# Patient Record
Sex: MALE | Race: WHITE | NOT HISPANIC OR LATINO | Employment: FULL TIME | URBAN - METROPOLITAN AREA
[De-identification: names, ages, dates, MRNs, and addresses within clinical notes are randomized per-mention and may not be internally consistent; named-entity substitution may affect disease eponyms.]

---

## 2017-05-30 ENCOUNTER — HOSPITAL ENCOUNTER (EMERGENCY)
Facility: HOSPITAL | Age: 32
Discharge: HOME | End: 2017-05-30
Attending: EMERGENCY MEDICINE | Admitting: EMERGENCY MEDICINE

## 2017-05-30 VITALS
RESPIRATION RATE: 18 BRPM | OXYGEN SATURATION: 98 % | HEART RATE: 82 BPM | HEIGHT: 65 IN | DIASTOLIC BLOOD PRESSURE: 61 MMHG | TEMPERATURE: 98.2 F | SYSTOLIC BLOOD PRESSURE: 120 MMHG | BODY MASS INDEX: 24.32 KG/M2 | WEIGHT: 146 LBS

## 2017-05-30 DIAGNOSIS — K08.89 PAIN, DENTAL: Primary | ICD-10-CM

## 2017-05-30 PROCEDURE — 99282 EMERGENCY DEPT VISIT SF MDM: CPT

## 2017-05-30 RX ORDER — PENICILLIN V POTASSIUM 500 MG/1
500 TABLET ORAL 3 TIMES DAILY
Qty: 30 TABLET | Refills: 0 | Status: SHIPPED | OUTPATIENT
Start: 2017-05-30 | End: 2017-06-09

## 2017-05-30 RX ORDER — OXYCODONE HYDROCHLORIDE AND ACETAMINOPHEN 5; 325 MG/1; MG/1
1 TABLET ORAL EVERY 6 HOURS PRN
Qty: 12 TABLET | Refills: 0 | Status: SHIPPED | OUTPATIENT
Start: 2017-05-30 | End: 2017-06-02

## 2017-08-24 ENCOUNTER — HOSPITAL ENCOUNTER (EMERGENCY)
Facility: HOSPITAL | Age: 32
Discharge: HOME | End: 2017-08-24
Attending: EMERGENCY MEDICINE | Admitting: EMERGENCY MEDICINE

## 2017-08-24 VITALS
DIASTOLIC BLOOD PRESSURE: 60 MMHG | BODY MASS INDEX: 23.3 KG/M2 | RESPIRATION RATE: 18 BRPM | HEART RATE: 76 BPM | WEIGHT: 140 LBS | OXYGEN SATURATION: 95 % | SYSTOLIC BLOOD PRESSURE: 133 MMHG | TEMPERATURE: 97.6 F

## 2017-08-24 DIAGNOSIS — T15.01XA FOREIGN BODY OF RIGHT CORNEA, INITIAL ENCOUNTER: Primary | ICD-10-CM

## 2017-08-24 PROCEDURE — 99283 EMERGENCY DEPT VISIT LOW MDM: CPT

## 2017-08-24 RX ADMIN — FLUORESCEIN SODIUM 1 STRIP: 1 STRIP OPHTHALMIC at 14:48

## 2018-02-28 ENCOUNTER — HOSPITAL ENCOUNTER (EMERGENCY)
Facility: HOSPITAL | Age: 33
Discharge: HOME | End: 2018-02-28
Attending: EMERGENCY MEDICINE

## 2018-02-28 VITALS
RESPIRATION RATE: 18 BRPM | OXYGEN SATURATION: 98 % | SYSTOLIC BLOOD PRESSURE: 111 MMHG | DIASTOLIC BLOOD PRESSURE: 64 MMHG | HEART RATE: 75 BPM | HEIGHT: 65 IN | TEMPERATURE: 97.7 F | WEIGHT: 140 LBS | BODY MASS INDEX: 23.32 KG/M2

## 2018-02-28 DIAGNOSIS — J01.90 ACUTE RHINOSINUSITIS: Primary | ICD-10-CM

## 2018-02-28 PROCEDURE — 99283 EMERGENCY DEPT VISIT LOW MDM: CPT

## 2018-02-28 RX ORDER — FLUTICASONE PROPIONATE 50 MCG
1 SPRAY, SUSPENSION (ML) NASAL DAILY
Qty: 16 G | Refills: 0 | Status: SHIPPED | OUTPATIENT
Start: 2018-02-28

## 2018-02-28 RX ORDER — AZITHROMYCIN 250 MG/1
TABLET, FILM COATED ORAL
Qty: 6 TABLET | Refills: 0 | Status: SHIPPED | OUTPATIENT
Start: 2018-02-28 | End: 2018-03-03

## 2018-02-28 NOTE — DISCHARGE INSTRUCTIONS
Rhinosinusitis   WHAT YOU NEED TO KNOW:   Rhinosinusitis (RS) is inflammation of your nose and sinuses  It commonly begins as a virus, often as a common cold  Viruses usually last 7 to 10 days and do not need treatment  When the virus does not get better on its own, you may have bacterial RS  This means that bacteria have begun to grow inside your sinuses  Acute RS lasts less than 4 weeks  Chronic RS lasts 12 weeks or more  Recurrent RS is when you have 4 or more episodes of RS in one year  DISCHARGE INSTRUCTIONS:   Return to the emergency department if:   · Your eye and eyelid are red, swollen, and painful  · You cannot open your eye  · You have double vision or you cannot see  · Your eyeball bulges out or you cannot move your eye  · You are more sleepy than normal or you notice changes in your ability to think, move, or talk  · You have a stiff neck, a fever, or a bad headache  · You have swelling of your forehead or scalp  Contact your healthcare provider if:   · Your symptoms are worse or do not improve after 3 to 5 days of treatment  · You have questions or concerns about your condition or care  Medicines: You may need any of the following:  · Acetaminophen  decreases pain and fever  It is available without a doctor's order  Ask how much to take and how often to take it  Follow directions  Acetaminophen can cause liver damage if not taken correctly  · NSAIDs , such as ibuprofen, help decrease swelling, pain, and fever  This medicine is available with or without a doctor's order  NSAIDs can cause stomach bleeding or kidney problems in certain people  If you take blood thinner medicine, always ask your healthcare provider if NSAIDs are safe for you  Always read the medicine label and follow directions  · Nasal steroid sprays  decrease inflammation in your nose and sinuses  · Decongestants  reduce swelling and drain mucus in the nose and sinuses   They may help you breathe easier  · Antihistamines  dry mucus in the nose and relieve sneezing  · Antibiotics  treat a bacterial infection and may be needed if your symptoms do not improve or they get worse  · Take your medicine as directed  Contact your healthcare provider if you think your medicine is not helping or if you have side effects  Tell him or her if you are allergic to any medicine  Keep a list of the medicines, vitamins, and herbs you take  Include the amounts, and when and why you take them  Bring the list or the pill bottles to follow-up visits  Carry your medicine list with you in case of an emergency  Self-care:   · Rinse your sinuses  Use a sinus rinse device to rinse your nasal passages with a saline (salt water) solution  This will help thin the mucus in your nose and rinse away pollen and dirt  It will also help reduce swelling so you can breathe normally  Ask your healthcare provider how often to do this  · Breathe in steam   Heat a bowl of water until you see steam  Lean over the bowl and make a tent over your head with a large towel  Breathe deeply for about 20 minutes  Be careful not to get too close to the steam or burn yourself  Do this 3 times a day  You can also breathe deeply when you take a hot shower  · Sleep with your head elevated  Place an extra pillow under your head before you go to sleep to help your sinuses drain  · Drink liquids as directed  Ask your healthcare provider how much liquid to drink each day and which liquids are best for you  Liquids will thin the mucus in your nose and help it drain  Avoid drinks that contain alcohol or caffeine  · Do not smoke, and avoid secondhand smoke  Nicotine and other chemicals in cigarettes and cigars can make your symptoms worse  Ask your healthcare provider for information if you currently smoke and need help to quit  E-cigarettes or smokeless tobacco still contain nicotine   Talk to your healthcare provider before you use these products  Follow up with your healthcare provider as directed: Follow up if your symptoms are worse or not better after 3 to 5 days of treatment  Write down your questions so you remember to ask them during your visits  © 2017 [address] [employer] Information is for End User's use only and may not be sold, redistributed or otherwise used for commercial purposes  All illustrations and images included in CareNotes® are the copyrighted property of [employer]  or Drake Mayo  The above information is an  only  It is not intended as medical advice for individual conditions or treatments  Talk to your doctor, nurse or pharmacist before following any medical regimen to see if it is safe and effective for you

## 2018-02-28 NOTE — ED PROVIDER NOTES
History  Chief Complaint   Patient presents with    Sinus Problem     woke up this morning with sinus congestion     28year old male with past medical history significant for depression, bipolar disorder and chronic pancreatitis presents to ed with chief complaint of sinus congestion and pressure  Onset of symptoms reported as this morning  Location is reported as the upper respiratory tract  Quality is reported as congestion with pressure  Severity is reported as moderate  Associated symptoms: denies headache, denies fevers, positive for runny nose, positive for cough  Denies rash, denies facial swelling, denies sore throat  Modifiers: nothing has been tried for treatment of symptoms  Context: denies recent sick contacts or travel outside the country  Positive current smoker  History provided by:  Patient   used: No    Sinus Problem   Associated symptoms: congestion, cough and rhinorrhea    Associated symptoms: no chest pain, no chills, no ear pain, no fatigue, no fever, no headaches, no nausea, no shortness of breath, no sneezing, no sore throat, no vomiting and no wheezing        None       Past Medical History:   Diagnosis Date    Pancreatitis     Psychiatric disorder     depression, bipolar        Past Surgical History:   Procedure Laterality Date    LYMPH NODE DISSECTION         History reviewed  No pertinent family history  I have reviewed and agree with the history as documented  Social History   Substance Use Topics    Smoking status: Current Every Day Smoker     Packs/day: 1 00    Smokeless tobacco: Not on file    Alcohol use No        Review of Systems   Constitutional: Negative for activity change, appetite change, chills, diaphoresis, fatigue, fever and unexpected weight change  HENT: Positive for congestion, postnasal drip, rhinorrhea and sinus pressure   Negative for dental problem, drooling, ear discharge, ear pain, facial swelling, hearing loss, mouth sores, nosebleeds, sneezing, sore throat, tinnitus, trouble swallowing and voice change  Eyes: Negative for photophobia, pain, discharge, redness, itching and visual disturbance  Respiratory: Positive for cough  Negative for chest tightness, shortness of breath, wheezing and stridor  Cardiovascular: Negative for chest pain, palpitations and leg swelling  Gastrointestinal: Negative for abdominal pain, constipation, diarrhea, nausea and vomiting  Endocrine: Negative for cold intolerance, heat intolerance, polydipsia, polyphagia and polyuria  Genitourinary: Negative for decreased urine volume, difficulty urinating, dysuria, flank pain, hematuria and urgency  Musculoskeletal: Negative for arthralgias, back pain, gait problem, joint swelling, myalgias, neck pain and neck stiffness  Skin: Negative for color change, pallor, rash and wound  Allergic/Immunologic: Negative for environmental allergies, food allergies and immunocompromised state  Neurological: Negative for dizziness, tremors, seizures, syncope, facial asymmetry, speech difficulty, weakness, light-headedness, numbness and headaches  Hematological: Negative for adenopathy  Does not bruise/bleed easily  Psychiatric/Behavioral: Negative for agitation, behavioral problems and confusion  The patient is not nervous/anxious  All other systems reviewed and are negative  Physical Exam  ED Triage Vitals [02/28/18 0949]   Temperature Pulse Respirations Blood Pressure SpO2   97 7 °F (36 5 °C) 75 18 111/64 98 %      Temp src Heart Rate Source Patient Position - Orthostatic VS BP Location FiO2 (%)   -- -- -- -- --      Pain Score       No Pain           Orthostatic Vital Signs  Vitals:    02/28/18 0949   BP: 111/64   Pulse: 75       Physical Exam   Constitutional: He is oriented to person, place, and time  He appears well-developed and well-nourished  No distress     /64   Pulse 75   Temp 97 7 °F (36 5 °C)   Resp 18   Ht 5' 5" (1 651 m)   Wt 63 5 kg (140 lb)   SpO2 98%   BMI 23 30 kg/m²   interp normal, no intervention    HENT:   Head: Normocephalic and atraumatic  Right Ear: External ear normal    Left Ear: External ear normal    Mouth/Throat: No oropharyngeal exudate  There is yellow mucous drainage noted to the posterior pharynx  Uvula is midline without deviation  Tonsils without edema or purulent exudate  Nasal turbinates areinjected and edematous bilaterally with yellow mucous nasal drainage noted  There is tenderness to percussion to bilateral maxillary sinuses  No overlying erythema or edema  Eyes: Conjunctivae and EOM are normal  Pupils are equal, round, and reactive to light  Right eye exhibits no discharge  Left eye exhibits no discharge  No scleral icterus  Neck: Normal range of motion  Neck supple  No JVD present  No tracheal deviation present  Cardiovascular: Normal rate and regular rhythm  Pulmonary/Chest: Effort normal  No stridor  No respiratory distress  He has no wheezes  He has no rales  He exhibits no tenderness  Breath sounds present bilaterally on auscultation  Scattered rhonchi bilaterally  No retractions or accessory muscle use  Abdominal: Soft  Bowel sounds are normal  He exhibits no distension and no mass  There is no tenderness  There is no rebound and no guarding  No hernia  Musculoskeletal: Normal range of motion  He exhibits no edema, tenderness or deformity  Lymphadenopathy:     He has no cervical adenopathy  Neurological: He is alert and oriented to person, place, and time  He has normal reflexes  He displays normal reflexes  No cranial nerve deficit or sensory deficit  He exhibits normal muscle tone  Coordination normal    Skin: Skin is warm and dry  Capillary refill takes less than 2 seconds  No rash noted  He is not diaphoretic  No erythema  No pallor  Psychiatric: He has a normal mood and affect   His behavior is normal  Judgment and thought content normal    Nursing note and vitals reviewed  ED Medications  Medications - No data to display    Diagnostic Studies  Results Reviewed     None                 No orders to display              Procedures  Procedures       Phone Contacts  ED Phone Contact    ED Course  ED Course                                MDM  Number of Diagnoses or Management Options  Acute rhinosinusitis: new and does not require workup  Diagnosis management comments: ddx includes but is not limited to:  URI, RSV, sinusitis, OE, OM, serous otitis media,  flu, allergies, viral syndrome, asthma, bronchitis, pneumonia, consider but doubt interstitial lung disease, pertussis  Discussed with patient symptoms most consistent with rhinosinusitis  Discussed outpatient treatment plan and follow up with pcp for recheck in 2-3 days  Reviewed reasons to return to ed  Patient verbalized understanding of diagnosis and agreement with discharge plan of care as well as understanding of reasons to return to ed      smoking cessation:  patient is a current cigarette smoker, patient was counselled regarding smoking cessation here in ed   counselling was performed for greater than 3 minutes during which time patient was awake and is receptive to counselling  patient was offered therapy  for treatment but declined  additional resources regarding smoking cessation were given on discharge  Patient Progress  Patient progress: stable    CritCare Time    Disposition  Final diagnoses:   Acute rhinosinusitis     Time reflects when diagnosis was documented in both MDM as applicable and the Disposition within this note     Time User Action Codes Description Comment    2/28/2018 10:57 AM Sixto Xiao Add [J01 90] Acute rhinosinusitis       ED Disposition     ED Disposition Condition Comment    Discharge  Berta Real discharge to home/self care      Condition at discharge: Stable        Follow-up Information     Follow up With Specialties Details Why Contact Info Additional Information    Behzad Irving MD Family Medicine Call in 2 days for further evaluation of symptoms [address] [address]  [address] [facility] Emergency Department Emergency Medicine Go to If symptoms worsen [address] [facility] [address]  [address] [facility] ED, [address] [place], [place], [facility]        Discharge Medication List as of 2/28/2018 10:58 AM      START taking these medications    Details   azithromycin (ZITHROMAX Z-PORFIRIO) 250 mg tablet 2 tablets PO on day 1, then 1 tablet PO daily x 4 days  , Print      fluticasone (FLONASE) 50 mcg/act nasal spray 1 spray into each nostril daily, Starting Wed 2/28/2018, Print           No discharge procedures on file      ED Provider  Electronically Signed by           Jannet Collins PA-C  03/06/18 0000

## 2018-08-22 ENCOUNTER — HOSPITAL ENCOUNTER (EMERGENCY)
Facility: HOSPITAL | Age: 33
Discharge: HOME | End: 2018-08-22
Attending: EMERGENCY MEDICINE

## 2018-08-22 VITALS
SYSTOLIC BLOOD PRESSURE: 114 MMHG | TEMPERATURE: 97.6 F | BODY MASS INDEX: 23.32 KG/M2 | DIASTOLIC BLOOD PRESSURE: 72 MMHG | RESPIRATION RATE: 16 BRPM | HEART RATE: 82 BPM | OXYGEN SATURATION: 98 % | HEIGHT: 65 IN | WEIGHT: 139.99 LBS

## 2018-08-22 DIAGNOSIS — J02.0 STREP PHARYNGITIS: Primary | ICD-10-CM

## 2018-08-22 LAB — S PYO AG THROAT QL: POSITIVE

## 2018-08-22 PROCEDURE — 99283 EMERGENCY DEPT VISIT LOW MDM: CPT

## 2018-08-22 PROCEDURE — 87430 STREP A AG IA: CPT | Performed by: EMERGENCY MEDICINE

## 2018-08-22 RX ORDER — AMOXICILLIN AND CLAVULANATE POTASSIUM 875; 125 MG/1; MG/1
1 TABLET, FILM COATED ORAL ONCE
Status: COMPLETED | OUTPATIENT
Start: 2018-08-22 | End: 2018-08-22

## 2018-08-22 RX ORDER — PREDNISONE 20 MG/1
60 TABLET ORAL ONCE
Status: COMPLETED | OUTPATIENT
Start: 2018-08-22 | End: 2018-08-22

## 2018-08-22 RX ORDER — AMOXICILLIN AND CLAVULANATE POTASSIUM 875; 125 MG/1; MG/1
1 TABLET, FILM COATED ORAL EVERY 12 HOURS SCHEDULED
Qty: 14 TABLET | Refills: 0 | Status: SHIPPED | OUTPATIENT
Start: 2018-08-22 | End: 2018-08-29

## 2018-08-22 RX ORDER — PREDNISONE 20 MG/1
60 TABLET ORAL DAILY
Qty: 15 TABLET | Refills: 0 | Status: SHIPPED | OUTPATIENT
Start: 2018-08-22

## 2018-08-22 RX ADMIN — PREDNISONE 60 MG: 20 TABLET ORAL at 10:18

## 2018-08-22 RX ADMIN — AMOXICILLIN AND CLAVULANATE POTASSIUM 1 TABLET: 875; 125 TABLET, FILM COATED ORAL at 10:18

## 2018-08-22 NOTE — DISCHARGE INSTRUCTIONS

## 2018-08-22 NOTE — ED PROVIDER NOTES
History  Chief Complaint   Patient presents with    Sore Throat     Patient stated that he has been having a sore throat for the last two days  80-year-old male presents emergency department uncomplicated sore throat  He states this started two days ago  He is here with his son who is having similar symptoms for four days  The patient does have pharyngeal erythema without swelling or tonsillar exudate  Patient will have a rapid strep done a work note will be given at his request         History provided by:  Patient   used: No    Sore Throat   Location:  Generalized  Quality:  Aching  Severity:  Mild  Onset quality:  Gradual  Duration:  2 days  Timing:  Constant  Progression:  Worsening  Chronicity:  New  Worsened by:  Nothing  Ineffective treatments:  None tried  Associated symptoms: no adenopathy, no drooling, no headaches, no plugged ear sensation, no rash and no sinus congestion        Prior to Admission Medications   Prescriptions Last Dose Informant Patient Reported? Taking?   fluticasone (FLONASE) 50 mcg/act nasal spray   No No   Si spray into each nostril daily      Facility-Administered Medications: None       Past Medical History:   Diagnosis Date    Pancreatitis     Psychiatric disorder     depression, bipolar        Past Surgical History:   Procedure Laterality Date    LYMPH NODE DISSECTION         History reviewed  No pertinent family history  I have reviewed and agree with the history as documented  Social History   Substance Use Topics    Smoking status: Current Every Day Smoker     Packs/day: 1 00    Smokeless tobacco: Never Used    Alcohol use No        Review of Systems   HENT: Positive for sore throat  Negative for drooling  Skin: Negative for rash  Neurological: Negative for headaches  Hematological: Negative for adenopathy  All other systems reviewed and are negative        Physical Exam  Physical Exam   Constitutional: He is oriented to person, place, and time  He appears well-developed and well-nourished  No distress  HENT:   Head: Normocephalic and atraumatic  Right Ear: External ear normal    Left Ear: External ear normal    Mouth/Throat:       Eyes: Conjunctivae and EOM are normal  Right eye exhibits no discharge  Left eye exhibits no discharge  No scleral icterus  Neck: Normal range of motion  Neck supple  No JVD present  No tracheal deviation present  No thyromegaly present  Cardiovascular: Normal rate and regular rhythm  Pulmonary/Chest: Effort normal and breath sounds normal  No stridor  No respiratory distress  He has no wheezes  He has no rales  Abdominal: Soft  Bowel sounds are normal  He exhibits no distension  There is no tenderness  Musculoskeletal: Normal range of motion  He exhibits no edema, tenderness or deformity  Neurological: He is alert and oriented to person, place, and time  No cranial nerve deficit  Coordination normal    Skin: Skin is warm and dry  He is not diaphoretic  Psychiatric: He has a normal mood and affect  His behavior is normal    Nursing note and vitals reviewed        Vital Signs  ED Triage Vitals [08/22/18 0924]   Temperature Pulse Respirations Blood Pressure SpO2   97 6 °F (36 4 °C) 82 16 114/72 98 %      Temp Source Heart Rate Source Patient Position - Orthostatic VS BP Location FiO2 (%)   Oral Monitor Sitting Right arm --      Pain Score       --           Vitals:    08/22/18 0924   BP: 114/72   Pulse: 82   Patient Position - Orthostatic VS: Sitting       Visual Acuity      ED Medications  Medications   amoxicillin-clavulanate (AUGMENTIN) 875-125 mg per tablet 1 tablet (1 tablet Oral Given 8/22/18 1018)   predniSONE tablet 60 mg (60 mg Oral Given 8/22/18 1018)       Diagnostic Studies  Results Reviewed     Procedure Component Value Units Date/Time    Rapid Strep A Screen Only, Adults [[id number]]  (Abnormal) Collected:  08/22/18 0935    Lab Status:  Final result Specimen:  Throat from Throat Updated:  08/22/18 0949     Rapid Strep A Screen Positive (A)                 No orders to display              Procedures  Procedures       Phone Contacts  ED Phone Contact    ED Course                               MDM  Number of Diagnoses or Management Options  Strep pharyngitis: new and requires workup     Amount and/or Complexity of Data Reviewed  Clinical lab tests: reviewed and ordered  Decide to obtain previous medical records or to obtain history from someone other than the patient: yes  Review and summarize past medical records: yes      CritCare Time    Disposition  Final diagnoses:   Strep pharyngitis     Time reflects when diagnosis was documented in both MDM as applicable and the Disposition within this note     Time User Action Codes Description Comment    8/22/2018 10:03 AM Ellie Stockton Add [J02 0] Strep pharyngitis       ED Disposition     ED Disposition Condition Comment    Discharge  Reynaldo Atlantic discharge to home/self care  Condition at discharge: Good        Follow-up Information     Follow up With Specialties Details Why Contact Info    Garrison Ramesh MD Family Medicine   [place] [address]  [address]  [phone]            Discharge Medication List as of 8/22/2018 10:04 AM      START taking these medications    Details   amoxicillin-clavulanate (AUGMENTIN) 875-125 mg per tablet Take 1 tablet by mouth every 12 (twelve) hours for 7 days, Starting Wed 8/22/2018, Until Wed 8/29/2018, Print      predniSONE 20 mg tablet Take 3 tablets (60 mg total) by mouth daily, Starting Wed 8/22/2018, Print         CONTINUE these medications which have NOT CHANGED    Details   fluticasone (FLONASE) 50 mcg/act nasal spray 1 spray into each nostril daily, Starting Wed 2/28/2018, Print           No discharge procedures on file      ED Provider  Electronically Signed by           Vane Collins DO  08/25/18 3054

## 2018-08-30 ENCOUNTER — HOSPITAL ENCOUNTER (EMERGENCY)
Facility: HOSPITAL | Age: 33
Discharge: HOME | End: 2018-08-30
Attending: EMERGENCY MEDICINE | Admitting: EMERGENCY MEDICINE

## 2018-08-30 VITALS
WEIGHT: 146 LBS | BODY MASS INDEX: 24.32 KG/M2 | HEART RATE: 83 BPM | RESPIRATION RATE: 16 BRPM | SYSTOLIC BLOOD PRESSURE: 145 MMHG | OXYGEN SATURATION: 95 % | DIASTOLIC BLOOD PRESSURE: 83 MMHG | TEMPERATURE: 99.4 F | HEIGHT: 65 IN

## 2018-08-30 DIAGNOSIS — H66.42: Primary | ICD-10-CM

## 2018-08-30 PROCEDURE — 99283 EMERGENCY DEPT VISIT LOW MDM: CPT

## 2018-08-30 RX ORDER — HYDROCODONE BITARTRATE AND ACETAMINOPHEN 5; 325 MG/1; MG/1
1 TABLET ORAL ONCE
Status: COMPLETED | OUTPATIENT
Start: 2018-08-30 | End: 2018-08-30

## 2018-08-30 RX ORDER — NAPROXEN 250 MG/1
500 TABLET ORAL ONCE
Status: COMPLETED | OUTPATIENT
Start: 2018-08-30 | End: 2018-08-30

## 2018-08-30 RX ORDER — CIPROFLOXACIN AND DEXAMETHASONE 3; 1 MG/ML; MG/ML
4 SUSPENSION/ DROPS AURICULAR (OTIC) ONCE
Status: COMPLETED | OUTPATIENT
Start: 2018-08-30 | End: 2018-08-30

## 2018-08-30 RX ORDER — HYDROCODONE BITARTRATE AND ACETAMINOPHEN 5; 325 MG/1; MG/1
1 TABLET ORAL EVERY 6 HOURS PRN
Qty: 15 TABLET | Refills: 0 | Status: SHIPPED | OUTPATIENT
Start: 2018-08-30 | End: 2018-09-02

## 2018-08-30 RX ORDER — NAPROXEN 500 MG/1
500 TABLET ORAL 2 TIMES DAILY WITH MEALS
Qty: 20 TABLET | Refills: 0 | Status: SHIPPED | OUTPATIENT
Start: 2018-08-30 | End: 2018-09-09

## 2018-08-30 RX ORDER — CEFUROXIME AXETIL 500 MG/1
500 TABLET ORAL EVERY 12 HOURS SCHEDULED
Qty: 20 TABLET | Refills: 0 | Status: SHIPPED | OUTPATIENT
Start: 2018-08-30 | End: 2018-09-09

## 2018-08-30 RX ORDER — CEFUROXIME AXETIL 250 MG/1
500 TABLET ORAL ONCE
Status: COMPLETED | OUTPATIENT
Start: 2018-08-30 | End: 2018-08-30

## 2018-08-30 RX ORDER — CIPROFLOXACIN AND DEXAMETHASONE 3; 1 MG/ML; MG/ML
4 SUSPENSION/ DROPS AURICULAR (OTIC) 2 TIMES DAILY
Qty: 7.5 ML | Refills: 0 | Status: SHIPPED | OUTPATIENT
Start: 2018-08-30 | End: 2018-09-03

## 2018-08-30 RX ORDER — AMOXICILLIN AND CLAVULANATE POTASSIUM 875; 125 MG/1; MG/1
1 TABLET, FILM COATED ORAL ONCE
Status: DISCONTINUED | OUTPATIENT
Start: 2018-08-30 | End: 2018-08-30

## 2018-08-30 RX ADMIN — CEFUROXIME AXETIL 500 MG: 250 TABLET ORAL at 20:44

## 2018-08-30 RX ADMIN — CIPROFLOXACIN AND DEXAMETHASONE 4 DROP: 3; 1 SUSPENSION/ DROPS AURICULAR (OTIC) at 20:29

## 2018-08-30 RX ADMIN — NAPROXEN 500 MG: 250 TABLET ORAL at 20:27

## 2018-08-30 RX ADMIN — HYDROCODONE BITARTRATE AND ACETAMINOPHEN 1 TABLET: 5; 325 TABLET ORAL at 20:27

## 2018-08-31 NOTE — DISCHARGE INSTRUCTIONS
Please return if he developed worsening or other concerning symptoms otherwise your to follow up with ENT for re-evaluation as discussed    Otitis Media   WHAT YOU NEED TO KNOW:   Otitis media is an ear infection  DISCHARGE INSTRUCTIONS:   Medicines:  · Ibuprofen or acetaminophen  helps decrease your pain and fever  They are available without a doctor's order  Ask your healthcare provider which medicine is right for you  Ask how much to take and how often to take it  These medicines can cause stomach bleeding if not taken correctly  Ibuprofen can cause kidney damage  Do not take ibuprofen if you have kidney disease, an ulcer, or allergies to aspirin  Acetaminophen can cause liver damage  Do not drink alcohol if you take acetaminophen  · Ear drops  help treat your ear pain  · Antibiotics  help treat a bacterial infection that caused your ear infection  · Take your medicine as directed  Contact your healthcare provider if you think your medicine is not helping or if you have side effects  Tell him or her if you are allergic to any medicine  Keep a list of the medicines, vitamins, and herbs you take  Include the amounts, and when and why you take them  Bring the list or the pill bottles to follow-up visits  Carry your medicine list with you in case of an emergency  Heat or ice:   · Heat  may be used to decrease your pain  Place a warm, moist washcloth on your ear  Apply for 15 to 20 minutes, 3 to 4 times a day    · Ice  helps decrease swelling and pain  Use an ice pack or put crushed ice in a plastic bag  Cover the ice pack with a towel and place it on your ear for 15 to 20 minutes, 3 to 4 times a day for 2 days  Prevent otitis media:   · Wash your hands often  Use soap and water  Wash your hands after you use the bathroom, change a child's diapers, or sneeze  Wash your hands before you prepare or eat food  · Stay away from people who are ill    Some germs are easily and quickly spread through contact  Return to work or school: You may return to work or school when your fever is gone  Follow up with your healthcare provider as directed:  Write down your questions so you remember to ask them during your visits  Contact your healthcare provider if:   · Your ear pain gets worse or does not go away, even after treatment  · The outside of your ear is red or swollen  · You have vomiting or diarrhea  · You have fluid coming from your ear  · You have questions or concerns about your condition or care  Return to the emergency department if:   · You have a seizure  · You have a fever and a stiff neck  © 2017 [facility] Information is for End User's use only and may not be sold, redistributed or otherwise used for commercial purposes  All illustrations and images included in CareNotes® are the copyrighted property of [employer]  or Drake Mayo  The above information is an  only  It is not intended as medical advice for individual conditions or treatments  Talk to your doctor, nurse or pharmacist before following any medical regimen to see if it is safe and effective for you

## 2018-08-31 NOTE — ED PROVIDER NOTES
History  Chief Complaint   Patient presents with    Earache     pt c/o left earache that goes down to his jaw  19-year-old male with history of psychiatric disorder presenting for evaluation of left ear pain  Patient is here with his wife he reports that he was seen evaluated approximately a week ago for pharyngitis he started on Augmentin, he is improved from that aspect states over last 2 days he has had increasing left ear pain describes as severe throbbing sensation it is radiates into his immediate jaw no exacerbating remitting factors, is not improved with conservative treatment he notes that he does have poor dentition but his teeth do not currently hurt him he otherwise denies fevers change in hearing difficulty with his balance ringing in his ear denies other auditory visual or balance complaint denies headache or neck pain chest pain cough shortness of breath nausea vomiting abdominal pain change in bowels or bladder joint pain swelling rashes or other associated symptoms  Complete review systems otherwise negative as noted            Prior to Admission Medications   Prescriptions Last Dose Informant Patient Reported? Taking?   amoxicillin-clavulanate (AUGMENTIN) 875-125 mg per tablet   No No   Sig: Take 1 tablet by mouth every 12 (twelve) hours for 7 days   fluticasone (FLONASE) 50 mcg/act nasal spray   No No   Si spray into each nostril daily   predniSONE 20 mg tablet   No No   Sig: Take 3 tablets (60 mg total) by mouth daily      Facility-Administered Medications: None       Past Medical History:   Diagnosis Date    Pancreatitis     Psychiatric disorder     depression, bipolar        Past Surgical History:   Procedure Laterality Date    LYMPH NODE DISSECTION         History reviewed  No pertinent family history  I have reviewed and agree with the history as documented      Social History   Substance Use Topics    Smoking status: Current Every Day Smoker     Packs/day: 1 00     Types: Cigarettes    Smokeless tobacco: Never Used    Alcohol use No        Review of Systems   Constitutional: Negative for chills and fever  HENT: Positive for dental problem and ear pain  Negative for congestion, drooling, ear discharge, hearing loss, rhinorrhea, sore throat, tinnitus, trouble swallowing and voice change  Eyes: Negative for photophobia, pain, redness, itching and visual disturbance  Respiratory: Negative for cough and shortness of breath  Cardiovascular: Negative for chest pain and palpitations  Gastrointestinal: Negative for abdominal pain, diarrhea, nausea and vomiting  Genitourinary: Negative for dysuria, frequency and urgency  Musculoskeletal: Negative for gait problem, neck pain and neck stiffness  Skin: Negative for color change and rash  Neurological: Negative for dizziness, facial asymmetry, weakness, light-headedness and headaches  Hematological: Negative for adenopathy  Does not bruise/bleed easily  Psychiatric/Behavioral: Negative for agitation and behavioral problems  All other systems reviewed and are negative  Physical Exam  Physical Exam   Constitutional: He is oriented to person, place, and time  He appears well-developed and well-nourished  No distress  Very well-appearing pleasant, here with wife   HENT:   Head: Normocephalic and atraumatic  Right Ear: External ear normal    Mouth/Throat: Oropharynx is clear and moist  No oropharyngeal exudate  Patient's head neck exam catheterization significant for left tympanic membranes is severely erythematous dull bulging there is scant amount of discharge adjacent to the tympanic membrane although no overt rupture the external auditory canal is clear without edema or debris or overt discharge, there is no mastoid tenderness erythema warmth head neck exam otherwise unremarkable   Eyes: EOM are normal  Pupils are equal, round, and reactive to light  Neck: Normal range of motion  Neck supple   No tracheal deviation present  Cardiovascular: Normal rate, regular rhythm and normal heart sounds  Exam reveals no gallop and no friction rub  No murmur heard  Pulmonary/Chest: Effort normal and breath sounds normal  He has no wheezes  He has no rales  Abdominal: Soft  Bowel sounds are normal  He exhibits no distension  There is no tenderness  There is no rebound and no guarding  Musculoskeletal: Normal range of motion  He exhibits no edema or tenderness  Neurological: He is alert and oriented to person, place, and time  No cranial nerve deficit  He exhibits normal muscle tone  Coordination normal    Normal gait finger to nose pronator drift dysdiadochokinesis is intact cranial nerves 2-12 were intact muscle strength is 5/5 globally   Skin: Skin is warm and dry  No rash noted  Psychiatric: He has a normal mood and affect  His behavior is normal    Nursing note and vitals reviewed        Vital Signs  ED Triage Vitals [08/30/18 1908]   Temperature Pulse Respirations Blood Pressure SpO2   99 4 °F (37 4 °C) 83 19 138/93 98 %      Temp Source Heart Rate Source Patient Position - Orthostatic VS BP Location FiO2 (%)   Oral Monitor Sitting Left arm --      Pain Score       Worst Possible Pain           Vitals:    08/30/18 1908 08/30/18 2043   BP: 138/93 145/83   Pulse: 83 83   Patient Position - Orthostatic VS: Sitting Lying       Visual Acuity      ED Medications  Medications   naproxen (NAPROSYN) tablet 500 mg (500 mg Oral Given 8/30/18 2027)   HYDROcodone-acetaminophen (NORCO) 5-325 mg per tablet 1 tablet (1 tablet Oral Given 8/30/18 2027)   ciprofloxacin-dexamethasone (CIPRODEX) 0 3-0 1 % otic suspension 4 drop (4 drops Left Ear Given 8/30/18 2029)   cefuroxime (CEFTIN) tablet 500 mg (500 mg Oral Given 8/30/18 2044)       Diagnostic Studies  Results Reviewed     None                 No orders to display              Procedures  Procedures       Phone Contacts  ED Phone Contact    ED Course MDM  Number of Diagnoses or Management Options  Suppurative otitis media of left ear:   Diagnosis management comments: 55-year-old male denies past medical history here for evaluation of left ear pain for last 2 days recently seen evaluated and treated for acute pharyngitis on Augmentin, pain is localized to his left ear radiates into his jaw he does have poor dentition but his teeth are not the source of his discomfort today, he has no facial weakness no difficulty with his balance no change in hearing or tinnitus he has no neck pain or stiffness or fevers on exam here he is afebrile normal vital signs patient is clinically very well appearing, he is neurologically intact he does have significant erythema bulging S scant amount of purulent discharge immediate to the tympanic membrane, there is no involvement of the external auditory canal there is no mastoid tenderness or erythema, consistent with suppurative otitis media possible minimal perforation, discussed this at length with patient and his wife, will change to Ceftin, pain controlled Ciprodex ENT follow-up very close return instructions should he developed difficulty with his balance facial weakness pain behind his ear or other concerning symptoms otherwise given ear precautions will follow up with ENT return instructions all agreement to plan    CritCare Time    Disposition  Final diagnoses:   Suppurative otitis media of left ear     Time reflects when diagnosis was documented in both MDM as applicable and the Disposition within this note     Time User Action Codes Description Comment    8/30/2018  8:31 PM Odalis Nicolas [[id number]] Suppurative otitis media of left ear       ED Disposition     ED Disposition Condition Comment    Discharge  Jaclyn Alert discharge to home/self care      Condition at discharge: Good        Follow-up Information     Follow up With Specialties Details Why Contact Info Additional Information    [facility] Emergency Department Emergency Medicine  If symptoms worsen [address] [facility] [address]  [phone] [place] [facility], [address], [place], [address] Nico Baker MD Otolaryngology In 3 days  [address]  [address]  [phone]             Discharge Medication List as of 8/30/2018  8:34 PM      START taking these medications    Details   cefuroxime (CEFTIN) 500 mg tablet Take 1 tablet (500 mg total) by mouth every 12 (twelve) hours for 10 days, Starting Th 8/30/2018, Until Sun 9/9/2018, Print      ciprofloxacin-dexamethasone (CIPRODEX) otic suspension Administer 4 drops into the left ear 2 (two) times a day for 7 doses, Starting Thu 8/30/2018, Until Mon 9/3/2018, Print      HYDROcodone-acetaminophen (NORCO) 5-325 mg per tablet Take 1 tablet by mouth every 6 (six) hours as needed for pain for up to 3 days Max Daily Amount: 4 tablets, Starting Thu 8/30/2018, Until Sun 9/2/2018, Print      naproxen (NAPROSYN) 500 mg tablet Take 1 tablet (500 mg total) by mouth 2 (two) times a day with meals for 10 days, Starting Thu 8/30/2018, Until Sun 9/9/2018, Print         CONTINUE these medications which have NOT CHANGED    Details   fluticasone (FLONASE) 50 mcg/act nasal spray 1 spray into each nostril daily, Starting Wed 2/28/2018, Print      predniSONE 20 mg tablet Take 3 tablets (60 mg total) by mouth daily, Starting Wed 8/22/2018, Print         STOP taking these medications       amoxicillin-clavulanate (AUGMENTIN) 875-125 mg per tablet Comments:   Reason for Stopping:             No discharge procedures on file      ED Provider  Electronically Signed by           Cherelle Melchor DO  08/31/18 4705

## 2018-08-31 NOTE — ED NOTES
Discharge instructions reviewed with patient  Patient verbalized understanding and denied any questions at this time  Pt  Ambulatory off unit with female visitor, who is patient's sober ride home       Isael Son RN  08/30/18 2046

## 2018-10-31 ENCOUNTER — APPOINTMENT (EMERGENCY)
Dept: RADIOLOGY | Facility: HOSPITAL | Age: 33
End: 2018-10-31

## 2018-10-31 ENCOUNTER — HOSPITAL ENCOUNTER (EMERGENCY)
Facility: HOSPITAL | Age: 33
Discharge: HOME | End: 2018-10-31
Attending: EMERGENCY MEDICINE | Admitting: EMERGENCY MEDICINE

## 2018-10-31 VITALS
TEMPERATURE: 97.5 F | DIASTOLIC BLOOD PRESSURE: 68 MMHG | OXYGEN SATURATION: 98 % | HEIGHT: 65 IN | WEIGHT: 141 LBS | RESPIRATION RATE: 16 BRPM | BODY MASS INDEX: 23.49 KG/M2 | HEART RATE: 65 BPM | SYSTOLIC BLOOD PRESSURE: 126 MMHG

## 2018-10-31 DIAGNOSIS — M79.652 ACUTE PAIN OF LEFT THIGH: Primary | ICD-10-CM

## 2018-10-31 DIAGNOSIS — S76.312A LEFT HAMSTRING STRAIN, INITIAL ENCOUNTER: ICD-10-CM

## 2018-10-31 PROCEDURE — 73552 X-RAY EXAM OF FEMUR 2/>: CPT

## 2018-10-31 PROCEDURE — 99283 EMERGENCY DEPT VISIT LOW MDM: CPT

## 2018-10-31 PROCEDURE — 96372 THER/PROPH/DIAG INJ SC/IM: CPT

## 2018-10-31 RX ORDER — CYCLOBENZAPRINE HCL 10 MG
10 TABLET ORAL
Qty: 10 TABLET | Refills: 0 | Status: SHIPPED | OUTPATIENT
Start: 2018-10-31 | End: 2018-11-10

## 2018-10-31 RX ORDER — KETOROLAC TROMETHAMINE 30 MG/ML
60 INJECTION, SOLUTION INTRAMUSCULAR; INTRAVENOUS ONCE
Status: COMPLETED | OUTPATIENT
Start: 2018-10-31 | End: 2018-10-31

## 2018-10-31 RX ORDER — IBUPROFEN 800 MG/1
800 TABLET ORAL 2 TIMES DAILY
Qty: 20 TABLET | Refills: 0 | Status: SHIPPED | OUTPATIENT
Start: 2018-10-31 | End: 2018-11-10

## 2018-10-31 RX ADMIN — KETOROLAC TROMETHAMINE 60 MG: 30 INJECTION, SOLUTION INTRAMUSCULAR at 11:37

## 2018-10-31 NOTE — ED PROVIDER NOTES
History  Chief Complaint   Patient presents with    Leg Pain     pt presents ambulatory with c/o L leg pain pt states "i was lifting a log at work and felt a pop" pt reports 10/10 pain for the last few weeks  HPI  80-year-old white male with a chief complaint of upper left leg pain  Patient states that he was lifting a lot at work a couple weeks ago and felt a pop  Since that time patient has been having pain in his posterior upper leg  Patient states that pain increases why he is standing on it  Patient is able to go up and down stairs  Prior to Admission Medications   Prescriptions Last Dose Informant Patient Reported? Taking?   ciprofloxacin-dexamethasone (CIPRODEX) otic suspension   No No   Sig: Administer 4 drops into the left ear 2 (two) times a day for 7 doses   fluticasone (FLONASE) 50 mcg/act nasal spray   No No   Si spray into each nostril daily   naproxen (NAPROSYN) 500 mg tablet   No No   Sig: Take 1 tablet (500 mg total) by mouth 2 (two) times a day with meals for 10 days   predniSONE 20 mg tablet   No No   Sig: Take 3 tablets (60 mg total) by mouth daily      Facility-Administered Medications: None       Past Medical History:   Diagnosis Date    Pancreatitis     Psychiatric disorder     depression, bipolar        Past Surgical History:   Procedure Laterality Date    LYMPH NODE DISSECTION         History reviewed  No pertinent family history  I have reviewed and agree with the history as documented  Social History   Substance Use Topics    Smoking status: Current Every Day Smoker     Packs/day: 1 00     Types: Cigarettes    Smokeless tobacco: Never Used    Alcohol use No        Review of Systems   Constitutional: Negative for chills and fever  HENT: Negative for congestion and rhinorrhea  Eyes: Negative for discharge and visual disturbance  Respiratory: Negative for shortness of breath and wheezing  Cardiovascular: Negative for chest pain and palpitations  Gastrointestinal: Negative for abdominal pain and vomiting  Endocrine: Negative for polydipsia and polyuria  Genitourinary: Negative for dysuria and hematuria  Musculoskeletal: Positive for arthralgias, gait problem and myalgias  Negative for neck stiffness  Skin: Negative for rash and wound  Neurological: Negative for dizziness and headaches  Psychiatric/Behavioral: Negative for confusion and suicidal ideas  Physical Exam  Physical Exam   Constitutional: He is oriented to person, place, and time  He appears well-developed and well-nourished  61-year-old white male lying on the stretcher with his knee slightly flexed complaining of left hamstring pain  HENT:   Head: Normocephalic and atraumatic  Mouth/Throat: Oropharynx is clear and moist    Eyes: Pupils are equal, round, and reactive to light  EOM are normal    Neck: Normal range of motion  Neck supple  Cardiovascular: Normal rate  Pulmonary/Chest: Effort normal    Abdominal: There is no tenderness  Musculoskeletal:   Left lower extremity:  There is tenderness to the posterior hamstring with some fullness noted at the distal aspect of the hamstring  Patient has difficulty in abduction and also some pain in flexion and extension  Neurological: He is alert and oriented to person, place, and time  No cranial nerve deficit  He exhibits normal muscle tone  Coordination normal    Skin: Skin is warm and dry  Psychiatric: He has a normal mood and affect  Nursing note and vitals reviewed        Vital Signs  ED Triage Vitals   Temperature Pulse Respirations Blood Pressure SpO2   10/31/18 1052 10/31/18 1051 10/31/18 1051 10/31/18 1051 10/31/18 1051   97 5 °F (36 4 °C) 83 20 114/63 98 %      Temp Source Heart Rate Source Patient Position - Orthostatic VS BP Location FiO2 (%)   10/31/18 1052 10/31/18 1051 10/31/18 1051 10/31/18 1051 --   Oral Monitor Sitting Right arm       Pain Score       10/31/18 1051       Worst Possible Pain Vitals:    10/31/18 1051 10/31/18 1203   BP: 114/63 126/68   Pulse: 83 65   Patient Position - Orthostatic VS: Sitting Lying       Visual Acuity      ED Medications  Medications   ketorolac (TORADOL) injection 60 mg (60 mg Intramuscular Given 10/31/18 1137)       Diagnostic Studies  Results Reviewed     None                 XR femur 2 views LEFT   ED Interpretation by Sean Lux DO (10/31 1205)   No fx      Final Result by Courtney Gilliam MD (10/31 1302)      No acute osseous abnormality  Workstation performed: [id number]                    Procedures  Procedures       Phone Contacts  ED Phone Contact    ED Course        I reviewed patient's x-rays with him that were normal   Patient received a shot of Toradol and an Ace wrap was applied  I explained to patient that he would have to follow up with an orthopedic surgeon and that I thought he could have a partial hamstring tear/strain  Patient was given a work note for 2 days  I placed patient on some high dose Motrin for 10 days as well as Flexeril at bedtime for muscle relaxation and placed patient's hamstring in an Ace wrap  MDM  CritCare Time     Differential diagnosis includes:  1  Left lower extremity pain  2  Left hamstring strain  3  Left hamstring tear  4  Musculoskeletal pain  5  Rule out occult fracture     Disposition  Final diagnoses:   Acute pain of left thigh   Left hamstring strain, initial encounter     Time reflects when diagnosis was documented in both MDM as applicable and the Disposition within this note     Time User Action Codes Description Comment    10/31/2018 11:58 AM Stephanie Nicolas [[id number]] Acute pain of left thigh     10/31/2018 12:00 PM Stephanie Nicolas [[id number]] Left hamstring strain, initial encounter       ED Disposition     ED Disposition Condition Comment    Discharge  Niharika Gonzalez discharge to home/self care      Condition at discharge: Good        Follow-up Information Follow up With Specialties Details Why [address], MD Orthopedic Surgery In 1 week  [address]  [facility]  [phone]            Discharge Medication List as of 10/31/2018 12:15 PM      START taking these medications    Details   cyclobenzaprine (FLEXERIL) 10 mg tablet Take 1 tablet (10 mg total) by mouth daily at bedtime for 10 days, Starting Wed 10/31/2018, Until Sat 11/10/2018, Print      ibuprofen (MOTRIN) 800 mg tablet Take 1 tablet (800 mg total) by mouth 2 (two) times a day for 10 days, Starting Wed 10/31/2018, Until Sat 11/10/2018, Print         CONTINUE these medications which have NOT CHANGED    Details   ciprofloxacin-dexamethasone (CIPRODEX) otic suspension Administer 4 drops into the left ear 2 (two) times a day for 7 doses, Starting Thu 8/30/2018, Until Mon 9/3/2018, Print      fluticasone (FLONASE) 50 mcg/act nasal spray 1 spray into each nostril daily, Starting Wed 2/28/2018, Print      naproxen (NAPROSYN) 500 mg tablet Take 1 tablet (500 mg total) by mouth 2 (two) times a day with meals for 10 days, Starting Thu 8/30/2018, Until Sun 9/9/2018, Print      predniSONE 20 mg tablet Take 3 tablets (60 mg total) by mouth daily, Starting Wed 8/22/2018, Print           No discharge procedures on file      ED Provider  Electronically Signed by           Kulwinder Werner DO  10/31/18 2669

## 2018-10-31 NOTE — DISCHARGE INSTRUCTIONS
Leg Cramps   WHAT YOU NEED TO KNOW:   A leg cramp is a sudden, painful squeeze in your calf (lower leg) or thigh (upper leg) muscles  The muscle may twitch under the skin or feel hard  Your leg may feel sore long after the muscles relax  DISCHARGE INSTRUCTIONS:   To stretch your leg:  Warm up your muscles before you stretch  Take a short walk or run slowly in place  If you get leg cramps while you sleep, you may also want to stretch before bedtime  The following exercises stretch the calves and thighs to help stop or prevent leg cramps:  · To stretch your calf and heel:      ¨ Stand up and place the palms of your hands against a wall  ¨ Lean into the wall, with one leg behind the other  Bend the front leg and keep the back leg straight  ¨ Press the heel of your back leg into the floor  ¨ Hold for 15 to 30 seconds, then switch sides  · To stretch the back of your knee and thigh:      ¨ Sit on the floor with your legs straight in front of you  Do not point your toes  ¨ Place the palms of your hands at your sides on the floor  Slide your hands past your hips toward your ankles  ¨ Move toward your ankles until you feel a stretch in the back of your legs  Do not try to touch your head to your knees or round your back  ¨ Hold for 30 seconds  Drink plenty of liquids during exercise:  Water and other liquids help prevent cramps by replacing fluids lost in sweat  Drink more liquids when you are active in hot weather  To stop a leg cramp if it happens again:   · Stretch and massage your muscle to stop the cramp  · Apply heat or ice packs to the cramp  A heating pad or hot pack may help relieve tight muscles  An ice pack or crushed ice in a bag, wrapped in a towel can soothe tender or sore muscles  Take your medicine as directed:  Call your healthcare provider if you think your medicine is not helping or if you have side effects  Tell him if you are taking any vitamins, herbs, or other medicines  Keep a list of the medicines you take  Include the amounts, and when and why you take them  Bring the list or the pill bottles to follow-up visits  Follow up with your healthcare provider as directed:  Write down any questions you have so you remember to ask them in your follow-up visits  Contact your healthcare provider if:   · Your leg cramps get worse or happen more often  · Your leg feels numb  · Your leg cramps do not go away with stretching or massage  · You feel dizzy, lightheaded, or confused when you exercise in hot weather  You may have a headache or blurred vision  © 2017 [employer]  Information is for End User's use only and may not be sold, redistributed or otherwise used for commercial purposes  All illustrations and images included in CareNotes® are the copyrighted property of A D [employer] M [employer] Inc  or [employer]  The above information is an  only  It is not intended as medical advice for individual conditions or treatments  Talk to your doctor, nurse or pharmacist before following any medical regimen to see if it is safe and effective for you  Hamstring Injury   WHAT YOU NEED TO KNOW:   A hamstring injury is a bruise, strain, or tear to one of your hamstring muscles  Your hamstring muscles are in the back of your thigh and help bend and straighten your leg  DISCHARGE INSTRUCTIONS:   Medicines:   · Medicines  can help decrease pain and swelling  · Take your medicine as directed  Contact your healthcare provider if you think your medicine is not helping or if you have side effects  Tell him of her if you are allergic to any medicine  Keep a list of the medicines, vitamins, and herbs you take  Include the amounts, and when and why you take them  Bring the list or the pill bottles to follow-up visits  Carry your medicine list with you in case of an emergency  Self-care:   · Rest  your hamstring muscles as directed      · You may need to use crutches  until you can put weight on your injured leg without pain  This will help decrease stress and strain on your hamstring muscles  · Apply ice  on the back of your thigh for 15 to 20 minutes every hour or as directed  Use an ice pack, or put crushed ice in a plastic bag  Cover it with a towel  Ice helps prevent tissue damage and decreases swelling and pain  · Wear an elastic bandage  to help decrease swelling  It should be snug but not tight  · Elevate  your leg above the level of your heart as often as you can  This will help decrease swelling and pain  Prop your leg on pillows or blankets to keep it elevated comfortably  · Go to physical therapy  A physical therapist teaches you exercises to help improve movement and strength, and to decrease pain  Prevent another hamstring injury:   · Ask when you can return to your usual activities  You may injure your hamstring muscles more if you start activity too soon  · Warm up and stretch before and after you exercise  This helps loosen your muscles and decrease stress on your hamstring muscles  Slowly increase time, distance, and how often you train  A sudden increase may cause injury  Follow up with your healthcare provider as directed:  Write down your questions so you remember to ask them during your visits  Contact your healthcare provider if:   · You have a fever  · Your signs and symptoms do not improve with treatment  · You have questions or concerns about your condition or care  Return to the emergency department if:   · Your lower leg or foot is pale or blue, and feels cool when you touch it  · You have severe pain  · You cannot bend or straighten your leg  © 2017 [address] [facility] Information is for End User's use only and may not be sold, redistributed or otherwise used for commercial purposes   All illustrations and images included in CareNotes® are the copyrighted property of A D A M , Inc  or [facility] [employer]  The above information is an  only  It is not intended as medical advice for individual conditions or treatments  Talk to your doctor, nurse or pharmacist before following any medical regimen to see if it is safe and effective for you  Ruthie Organ

## 2019-08-29 ENCOUNTER — HOSPITAL ENCOUNTER (EMERGENCY)
Facility: HOSPITAL | Age: 34
Discharge: HOME | End: 2019-08-29
Attending: EMERGENCY MEDICINE

## 2019-08-29 VITALS
RESPIRATION RATE: 18 BRPM | HEART RATE: 77 BPM | HEIGHT: 66 IN | WEIGHT: 141.09 LBS | TEMPERATURE: 98 F | OXYGEN SATURATION: 99 % | SYSTOLIC BLOOD PRESSURE: 138 MMHG | DIASTOLIC BLOOD PRESSURE: 89 MMHG | BODY MASS INDEX: 22.68 KG/M2

## 2019-08-29 DIAGNOSIS — T30.0 THERMAL BURN: ICD-10-CM

## 2019-08-29 DIAGNOSIS — T22.011A: Primary | ICD-10-CM

## 2019-08-29 PROCEDURE — 99283 EMERGENCY DEPT VISIT LOW MDM: CPT | Performed by: EMERGENCY MEDICINE

## 2019-08-29 PROCEDURE — 99283 EMERGENCY DEPT VISIT LOW MDM: CPT

## 2019-08-29 RX ORDER — OXYCODONE HYDROCHLORIDE AND ACETAMINOPHEN 5; 325 MG/1; MG/1
1 TABLET ORAL EVERY 6 HOURS PRN
Qty: 15 TABLET | Refills: 0 | Status: SHIPPED | OUTPATIENT
Start: 2019-08-29

## 2019-08-29 RX ORDER — GINSENG 100 MG
1 CAPSULE ORAL ONCE
Status: COMPLETED | OUTPATIENT
Start: 2019-08-29 | End: 2019-08-29

## 2019-08-29 RX ORDER — BACITRACIN ZINC AND POLYMYXIN B SULFATE 500; 1000 [USP'U]/G; [USP'U]/G
OINTMENT TOPICAL 2 TIMES DAILY
Qty: 15 G | Refills: 0 | Status: SHIPPED | OUTPATIENT
Start: 2019-08-29

## 2019-08-29 RX ADMIN — BACITRACIN ZINC 1 LARGE APPLICATION: 500 OINTMENT TOPICAL at 19:28

## 2019-08-29 NOTE — ED PROVIDER NOTES
History  Chief Complaint   Patient presents with    Burn     burned right forearm with hot melted pipe      HPI  Patient is 54-year-old male, he reports he was at work with his boss he reports they were melting a pipe to bend it and apparently it slipped and landed on his right forearm  Patient reports a was some plastic material also the melted on his forearm  Complains of a burn on his right forearm  Approximately 3 cm circumferential   Patient denies any problems with infection in the past   Reports he is up-to-date on his tetanus shots  Past medical history  Lyme disease, pancreatitis  Family history noncontributory  Social history, smoker, employed  Prior to Admission Medications   Prescriptions Last Dose Informant Patient Reported? Taking?   ciprofloxacin-dexamethasone (CIPRODEX) otic suspension   No No   Sig: Administer 4 drops into the left ear 2 (two) times a day for 7 doses   cyclobenzaprine (FLEXERIL) 10 mg tablet   No No   Sig: Take 1 tablet (10 mg total) by mouth daily at bedtime for 10 days   fluticasone (FLONASE) 50 mcg/act nasal spray Not Taking at Unknown time  No No   Si spray into each nostril daily   Patient not taking: Reported on 2019   ibuprofen (MOTRIN) 800 mg tablet   No No   Sig: Take 1 tablet (800 mg total) by mouth 2 (two) times a day for 10 days   naproxen (NAPROSYN) 500 mg tablet   No No   Sig: Take 1 tablet (500 mg total) by mouth 2 (two) times a day with meals for 10 days   predniSONE 20 mg tablet Not Taking at Unknown time  No No   Sig: Take 3 tablets (60 mg total) by mouth daily   Patient not taking: Reported on 2019      Facility-Administered Medications: None       Past Medical History:   Diagnosis Date    Pancreatitis     Psychiatric disorder     depression, bipolar        Past Surgical History:   Procedure Laterality Date    LYMPH NODE DISSECTION         History reviewed  No pertinent family history    I have reviewed and agree with the history as documented  Social History     Tobacco Use    Smoking status: Current Every Day Smoker     Packs/day: 1 00     Types: Cigarettes    Smokeless tobacco: Never Used   Substance Use Topics    Alcohol use: No    Drug use: No        Review of Systems   Constitutional: Negative for fever  Skin: Positive for wound  Neurological: Negative for weakness and numbness  Physical Exam  Physical Exam   Constitutional: He is oriented to person, place, and time  He appears well-developed and well-nourished  HENT:   Head: Normocephalic  Right Ear: External ear normal    Left Ear: External ear normal    Nose: Nose normal    Mouth/Throat: Oropharynx is clear and moist    Eyes: Pupils are equal, round, and reactive to light  EOM and lids are normal    Neck: Normal range of motion  Neck supple  Pulmonary/Chest: Effort normal  No respiratory distress  Musculoskeletal: Normal range of motion  He exhibits no deformity  Neurological: He is alert and oriented to person, place, and time  Skin: Skin is warm and dry  Approximately 3 x 3 cm 1/3 body surface area second-degree burn, positive rete pegs Burn on the patient's right forearm, cleaned by me , distal neurovascular tendon intact  Psychiatric: He has a normal mood and affect  Nursing note and vitals reviewed        Vital Signs  ED Triage Vitals [08/29/19 1915]   Temperature Pulse Respirations Blood Pressure SpO2   98 °F (36 7 °C) 77 18 138/89 99 %      Temp Source Heart Rate Source Patient Position - Orthostatic VS BP Location FiO2 (%)   Oral Monitor Sitting Left arm --      Pain Score       5           Vitals:    08/29/19 1915   BP: 138/89   Pulse: 77   Patient Position - Orthostatic VS: Sitting         Visual Acuity      ED Medications  Medications   bacitracin topical ointment 1 large application (1 large application Topical Given 8/29/19 1928)       Diagnostic Studies  Results Reviewed     None                 No orders to display Procedures  Procedures       ED Course                               MDMMedical decision making 28-year-old male with circular small burn on his right forearm, approximately 1/3 of his palm surface area, second-degree burn, discussed outpatient treatment follow-up discussed analgesics, discussed indications to return  Disposition  Final diagnoses:   Burn of right forearm - Second-degree 1/3 percent body surface area   Thermal burn     Time reflects when diagnosis was documented in both MDM as applicable and the Disposition within this note     Time User Action Codes Description Comment    8/29/2019  7:22 PM Beth Kang Add [T22 011A] Burn of right forearm     8/29/2019  7:22 PM Beth Kang Modify [T22 011A] Burn of right forearm Second-degree 1/3 percent body surface area    8/29/2019  7:23 PM Beth Kang Add [T30 0] Thermal burn       ED Disposition     ED Disposition Condition Date/Time Comment    Discharge Stable Thu Aug 29, 2019  7:22 PM Kaleb Parks discharge to home/self care              Follow-up Information     Follow up With Specialties Details [address], [address] [facility], Nurse Practitioner   [address] [place] [address]  [phone]            Discharge Medication List as of 8/29/2019  7:25 PM      START taking these medications    Details   bacitracin-polymyxin b (POLYSPORIN) ointment Apply topically 2 (two) times a day, Starting Thu 8/29/2019, Print      oxyCODONE-acetaminophen (PERCOCET) 5-325 mg per tablet Take 1 tablet by mouth every 6 (six) hours as needed for severe pain for up to 15 dosesMax Daily Amount: 4 tablets, Starting Thu 8/29/2019, Print         CONTINUE these medications which have NOT CHANGED    Details   ciprofloxacin-dexamethasone (CIPRODEX) otic suspension Administer 4 drops into the left ear 2 (two) times a day for 7 doses, Starting Thu 8/30/2018, Until Mon 9/3/2018, Print      cyclobenzaprine (FLEXERIL) 10 mg tablet Take 1 tablet (10 mg total) by mouth daily at bedtime for 10 days, Starting Wed 10/31/2018, Until Sat 11/10/2018, Print      fluticasone (FLONASE) 50 mcg/act nasal spray 1 spray into each nostril daily, Starting Wed 2/28/2018, Print      ibuprofen (MOTRIN) 800 mg tablet Take 1 tablet (800 mg total) by mouth 2 (two) times a day for 10 days, Starting Wed 10/31/2018, Until Sat 11/10/2018, Print      naproxen (NAPROSYN) 500 mg tablet Take 1 tablet (500 mg total) by mouth 2 (two) times a day with meals for 10 days, Starting u 8/30/2018, Until Sun 9/9/2018, Print      predniSONE 20 mg tablet Take 3 tablets (60 mg total) by mouth daily, Starting Wed 8/22/2018, Print           No discharge procedures on file      ED Provider  Electronically Signed by           Andre Leahy MD  08/29/19 2006

## 2019-08-29 NOTE — DISCHARGE INSTRUCTIONS
Antibiotic ointment dressings daily  Motrin Tylenol for mild pain  Percocet 1 every 6 hours if needed for severe pain caution narcotic will make a sleepy  Follow up with your family doctor return increasing redness swelling or any problems  Superficial Burn   WHAT YOU NEED TO KNOW:   A superficial burn, or first-degree burn, is when the outer layer of skin has been burned  DISCHARGE INSTRUCTIONS:   Call 911 for any of the following: You have trouble breathing  Return to the emergency department if:   You have a burn to the face, neck, hands, or genitals  Your burn covers a large area such as your trunk or entire arm or leg  You have increased redness, numbness, or swelling in the superficial burn area  You have red streaks or blisters spreading outward from the superficial burn  Your pain is not relieved, or is getting worse even after you take medicine  Contact your healthcare provider if:   You have a fever  You have questions or concerns about your condition or care  Medicines:   Ibuprofen or acetaminophen  are given to decrease your pain and swelling  They are available without a doctor's order  These medicines can cause liver or kidney problems if they are not used correctly  Ask how much medicine is safe to take, and how often to take it  Take your medicine as directed  Contact your healthcare provider if you think your medicine is not helping or if you have side effects  Tell him of her if you are allergic to any medicine  Keep a list of the medicines, vitamins, and herbs you take  Include the amounts, and when and why you take them  Bring the list or the pill bottles to follow-up visits  Carry your medicine list with you in case of an emergency  Follow up with your healthcare provider as directed:  Write down your questions so you remember to ask them during your visits    First aid for a superficial burn:  A superficial burn usually heals in 3 to 5 days without scarring or blisters  Use the following first-aid guide to treat your burn:  Remove clothing and jewelry immediately  Flush liquid chemicals from your skin completely with large amounts of cool running water  Do not splash the chemicals into your eyes  Brush dry chemicals off your skin if large amounts of water are not available  Small amounts of water will activate some chemicals, such as lime, and cause more damage  Do not splash the chemicals into your eyes  Run cool or cold temperature water over the burned area for 10 minutes  A cold or cool compress can also be put on the burn  Do not use ice or ice water on the affected area  This may cause more damage to the skin  Use an antibacterial ointment or skin cream, such as aloe vera cream, to soothe the skin  Do not put butter, petroleum jelly, or other home remedies on skin burned by a chemical     Do not put a bandage on the burn until you are told to do so by your healthcare provider  Prevent superficial burns:   Do not leave cups, mugs, or bowls containing hot liquids at the edge of a table  Keep pot handles turned away from the stove front  Do not leave a lit cigarette  Discard it properly  Keep cigarette lighters and matches in a safe place where children cannot reach them  Keep your water heater setting to low or medium (90°F to 120°F, or 32°C to 48°C)  Wear sunscreen that has a sun protectant factor (SPF) of 15 or higher  The sunscreen should also have ultraviolet A (UVA) and ultraviolet B (UVB) protection  Follow the directions on the label when you use sunscreen  Put on more sunscreen if you are in the sun for more than an hour  Reapply sunscreen often if you go swimming or are sweating  © 2017 [employer]  Information is for End User's use only and may not be sold, redistributed or otherwise used for commercial purposes   All illustrations and images included in CareNotes® are the copyrighted property of [employer]  or [employer]  The above information is an  only  It is not intended as medical advice for individual conditions or treatments  Talk to your doctor, nurse or pharmacist before following any medical regimen to see if it is safe and effective for you